# Patient Record
Sex: MALE | Race: BLACK OR AFRICAN AMERICAN | URBAN - METROPOLITAN AREA
[De-identification: names, ages, dates, MRNs, and addresses within clinical notes are randomized per-mention and may not be internally consistent; named-entity substitution may affect disease eponyms.]

---

## 2017-12-19 ENCOUNTER — HOSPITAL ENCOUNTER (OUTPATIENT)
Facility: CLINIC | Age: 72
Discharge: HOME | End: 2017-12-19
Attending: OPHTHALMOLOGY | Admitting: OPHTHALMOLOGY
Payer: COMMERCIAL

## 2017-12-19 VITALS
DIASTOLIC BLOOD PRESSURE: 96 MMHG | BODY MASS INDEX: 35.12 KG/M2 | TEMPERATURE: 96.9 F | WEIGHT: 265 LBS | OXYGEN SATURATION: 98 % | RESPIRATION RATE: 16 BRPM | HEIGHT: 73 IN | SYSTOLIC BLOOD PRESSURE: 140 MMHG

## 2017-12-19 PROCEDURE — 65855 TRABECULOPLASTY LASER SURG: CPT | Performed by: OPHTHALMOLOGY

## 2017-12-19 PROCEDURE — 25000132 ZZH RX MED GY IP 250 OP 250 PS 637: Performed by: OPHTHALMOLOGY

## 2017-12-19 PROCEDURE — 25000125 ZZHC RX 250: Performed by: OPHTHALMOLOGY

## 2017-12-19 RX ORDER — ACETAZOLAMIDE 500 MG/1
500 CAPSULE, EXTENDED RELEASE ORAL EVERY 12 HOURS SCHEDULED
Status: DISCONTINUED | OUTPATIENT
Start: 2017-12-19 | End: 2017-12-19 | Stop reason: HOSPADM

## 2017-12-19 RX ORDER — ACETAZOLAMIDE 250 MG/1
500 TABLET ORAL ONCE
Status: DISCONTINUED | OUTPATIENT
Start: 2017-12-19 | End: 2017-12-19 | Stop reason: HOSPADM

## 2017-12-19 RX ORDER — PILOCARPINE HYDROCHLORIDE 10 MG/ML
1 SOLUTION/ DROPS OPHTHALMIC ONCE
Status: COMPLETED | OUTPATIENT
Start: 2017-12-19 | End: 2017-12-19

## 2017-12-19 RX ADMIN — PILOCARPINE HYDROCHLORIDE 1 DROP: 10 SOLUTION/ DROPS OPHTHALMIC at 09:41

## 2017-12-19 RX ADMIN — ACETAZOLAMIDE 500 MG: 500 CAPSULE, EXTENDED RELEASE ORAL at 12:48

## 2017-12-19 RX ADMIN — APRACLONIDINE HYDROCHLORIDE 1 DROP: 10 SOLUTION/ DROPS OPHTHALMIC at 09:42

## 2017-12-19 NOTE — OR NURSING
Corina pressure checks as follows...42, 44, 45.  MD phoned with no answe rx 3, message left to return call.

## 2017-12-19 NOTE — OP NOTE
PREOPERATIVE DIAGNOSIS: Open Angle Glaucoma, Right eye.   POSTOPERATIVE DIAGNOSIS: Open Angle Glaucoma, Right eye.   PROCEDURE: Selective Laser Trabeculoplasty, Right eye   ANESTHESIA: Topical   COMPLICATIONS: None   INDICATIONS FOR PROCEDURE: Renato Sánchez is a 72 year old patient with open angle glaucoma who would benefit from lower intraocular pressure. Selective Laser Trabeculoplasty was offered  to lower intraocular pressure. After careful discussion regarding the risks, benefits and alternatives of Selective Laser Trabeculoplasty, with risks including intraocular inflammation, intraocular pressure spike, and inadequate response, the patient elected to proceed, and informed, written consent was obtained.   DESCRIPTION OF PROCEDURE: On the day of the procedure, the patient was identified in the preoperative waiting area, and interval history was obtained. It was unremarkable for change. All sources indicated the Right Eye was the correct surgical site. A drop of Iopidine and pilocarpine had previously been placed on the eye. The patient was then escorted to the laser suite and positioned upright at the laser slit lamp. A drop of proparacaine was placed on the Right ocular surface and Goniosol used to couple the gonioscopy lens to the eye. A total of 89 shots of laser ranging in energy from 1.0 to 1.2 millijoules was applied to almost 360 degrees of anterior trabecular meshwork. There were a few areas of PAS inferiorly that prevented treatment in those areas. The lens was then removed from the eye and the eye irrigated. An additional drop of Iopidine was placed. The patient's intraocular pressure was checked one hour postoperatively. The patient was discharged home with instructions to call should he experience redness or discomfort. She will follow up in one week. There were no complications.

## 2019-04-19 ENCOUNTER — HOME INFUSION (PRE-WILLOW HOME INFUSION) (OUTPATIENT)
Dept: PHARMACY | Facility: CLINIC | Age: 74
End: 2019-04-19

## 2019-04-22 ENCOUNTER — HOME INFUSION (PRE-WILLOW HOME INFUSION) (OUTPATIENT)
Dept: PHARMACY | Facility: CLINIC | Age: 74
End: 2019-04-22

## 2019-04-23 NOTE — PROGRESS NOTES
This is a recent snapshot of the patient's [facility] medical record.  For current drug dose and complete information and questions, call [phone] or In Basket pool, [facility] home infusion ([id number])  CSN Number:  [id number]

## 2019-06-09 ENCOUNTER — RECORDS - HEALTHEAST (OUTPATIENT)
Dept: ADMINISTRATIVE | Facility: OTHER | Age: 74
End: 2019-06-09

## 2019-07-03 ENCOUNTER — ANESTHESIA - HEALTHEAST (OUTPATIENT)
Dept: SURGERY | Facility: CLINIC | Age: 74
End: 2019-07-03

## 2019-07-10 ENCOUNTER — HOME INFUSION (PRE-WILLOW HOME INFUSION) (OUTPATIENT)
Dept: PHARMACY | Facility: CLINIC | Age: 74
End: 2019-07-10

## 2019-07-15 NOTE — PROGRESS NOTES
This is a recent snapshot of the patient's [facility] medical record.  For current drug dose and complete information and questions, call [phone] or In [facility]  home infusion ([id number])  CSN Number:

## 2019-07-24 ENCOUNTER — HOME INFUSION (PRE-WILLOW HOME INFUSION) (OUTPATIENT)
Dept: PHARMACY | Facility: CLINIC | Age: 74
End: 2019-07-24

## 2023-10-28 NOTE — PROGRESS NOTES
This is a recent snapshot of the patient's [facility] medical record.  For current drug dose and complete information and questions, call [phone] or In Basket pool, [facility] home infusion ([id number])  CSN Number:  [id number]       Yes

## (undated) RX ORDER — ACETAZOLAMIDE 500 MG/1
CAPSULE, EXTENDED RELEASE ORAL
Status: DISPENSED
Start: 2017-12-19